# Patient Record
Sex: MALE | Race: BLACK OR AFRICAN AMERICAN
[De-identification: names, ages, dates, MRNs, and addresses within clinical notes are randomized per-mention and may not be internally consistent; named-entity substitution may affect disease eponyms.]

---

## 2018-01-02 ENCOUNTER — HOSPITAL ENCOUNTER (EMERGENCY)
Dept: HOSPITAL 62 - ER | Age: 38
Discharge: HOME | End: 2018-01-02
Payer: SELF-PAY

## 2018-01-02 VITALS — SYSTOLIC BLOOD PRESSURE: 139 MMHG | DIASTOLIC BLOOD PRESSURE: 92 MMHG

## 2018-01-02 DIAGNOSIS — R51: ICD-10-CM

## 2018-01-02 DIAGNOSIS — B34.9: ICD-10-CM

## 2018-01-02 DIAGNOSIS — M79.602: Primary | ICD-10-CM

## 2018-01-02 LAB
A TYPE INFLUENZA AG: NEGATIVE
ADD MANUAL DIFF: NO
ANION GAP SERPL CALC-SCNC: 8 MMOL/L (ref 5–19)
B INFLUENZA AG: NEGATIVE
BASOPHILS # BLD AUTO: 0 10^3/UL (ref 0–0.2)
BASOPHILS NFR BLD AUTO: 0.8 % (ref 0–2)
BUN SERPL-MCNC: 9 MG/DL (ref 7–20)
CALCIUM: 8.3 MG/DL (ref 8.4–10.2)
CHLORIDE SERPL-SCNC: 105 MMOL/L (ref 98–107)
CK SERPL-CCNC: 54 U/L (ref 55–170)
CO2 SERPL-SCNC: 23 MMOL/L (ref 22–30)
EOSINOPHIL # BLD AUTO: 0 10^3/UL (ref 0–0.6)
EOSINOPHIL NFR BLD AUTO: 0 % (ref 0–6)
ERYTHROCYTE [DISTWIDTH] IN BLOOD BY AUTOMATED COUNT: 15.8 % (ref 11.5–14)
GLUCOSE SERPL-MCNC: 130 MG/DL (ref 75–110)
HCT VFR BLD CALC: 46 % (ref 37.9–51)
HGB BLD-MCNC: 14.8 G/DL (ref 13.5–17)
LYMPHOCYTES # BLD AUTO: 1.3 10^3/UL (ref 0.5–4.7)
LYMPHOCYTES NFR BLD AUTO: 31.7 % (ref 13–45)
MAGNESIUM SERPL-MCNC: 1.8 MG/DL (ref 1.6–2.3)
MCH RBC QN AUTO: 22.8 PG (ref 27–33.4)
MCHC RBC AUTO-ENTMCNC: 32.2 G/DL (ref 32–36)
MCV RBC AUTO: 71 FL (ref 80–97)
MONOCYTES # BLD AUTO: 0.3 10^3/UL (ref 0.1–1.4)
MONOCYTES NFR BLD AUTO: 6.9 % (ref 3–13)
NEUTROPHILS # BLD AUTO: 2.5 10^3/UL (ref 1.7–8.2)
NEUTS SEG NFR BLD AUTO: 60.6 % (ref 42–78)
PLATELET # BLD: 206 10^3/UL (ref 150–450)
POTASSIUM SERPL-SCNC: 4 MMOL/L (ref 3.6–5)
RBC # BLD AUTO: 6.5 10^6/UL (ref 4.35–5.55)
SODIUM SERPL-SCNC: 135.8 MMOL/L (ref 137–145)
TOTAL CELLS COUNTED % (AUTO): 100 %
WBC # BLD AUTO: 4.2 10^3/UL (ref 4–10.5)

## 2018-01-02 PROCEDURE — 99284 EMERGENCY DEPT VISIT MOD MDM: CPT

## 2018-01-02 PROCEDURE — 80048 BASIC METABOLIC PNL TOTAL CA: CPT

## 2018-01-02 PROCEDURE — 93005 ELECTROCARDIOGRAM TRACING: CPT

## 2018-01-02 PROCEDURE — 85025 COMPLETE CBC W/AUTO DIFF WBC: CPT

## 2018-01-02 PROCEDURE — 96374 THER/PROPH/DIAG INJ IV PUSH: CPT

## 2018-01-02 PROCEDURE — 93010 ELECTROCARDIOGRAM REPORT: CPT

## 2018-01-02 PROCEDURE — 36415 COLL VENOUS BLD VENIPUNCTURE: CPT

## 2018-01-02 PROCEDURE — 82550 ASSAY OF CK (CPK): CPT

## 2018-01-02 PROCEDURE — 96361 HYDRATE IV INFUSION ADD-ON: CPT

## 2018-01-02 PROCEDURE — 96375 TX/PRO/DX INJ NEW DRUG ADDON: CPT

## 2018-01-02 PROCEDURE — 87804 INFLUENZA ASSAY W/OPTIC: CPT

## 2018-01-02 PROCEDURE — 83735 ASSAY OF MAGNESIUM: CPT

## 2018-01-02 RX ADMIN — SODIUM CHLORIDE PRN ML: 9 INJECTION, SOLUTION INTRAVENOUS at 04:46

## 2018-01-02 RX ADMIN — SODIUM CHLORIDE PRN ML: 9 INJECTION, SOLUTION INTRAVENOUS at 04:19

## 2018-01-02 NOTE — ER DOCUMENT REPORT
ED General





<TERRANCEFARZANA - Last Filed: 01/02/18 05:22>





- General


TRAVEL OUTSIDE OF THE [place] IN LAST 30 DAYS: No





<RAMYA BEYER - Last Filed: 01/02/18 06:07>





- General


Chief Complaint: Headache, L arm pain


Stated Complaint: HEADACHE,LEFT ARM PAIN


Time Seen by Provider: 01/02/18 03:17





- HPI


Notes: 





Patient is a 37-year-old male with no significant past medical history who 

presents to the ED complaining of a headache, decreased appetite, left shoulder 

and arm pain, bilateral knee pain, and bilateral feet pain.  Patient states 

that he feels that he has body aches and does feel subjectively warm.  Patient 

states that his symptoms have been ongoing for 2 days now which began with him 

feeling "lousy."  Patient states that one-point the headache was a 10 out of 10

, but is currently a 5 out of 10.  Patient states that his headache starts in 

the back of his head and works its way around and on top.  Patient states that 

he does have muscle soreness to the neck and left arm.  Pt has had issues with 

moving the left arm due to the pain.  He is otherwise eating and drinking 

without any difficulties.  He is urinating normally and having normal bowel 

movements.  He denies any IV drug use.  He denies any drug allergies.  Denies 

any fever, head injury, neck pain, changes in vision/speech/mentation/hearing, 

URI, sore throat, chest pain, palpitations, syncope, cough, shortness of breath

, wheeze, dyspnea, abdominal pain, nausea/vomiting/diarrhea, urinary retention, 

dysuria, hematuria, loss of control of bowel or bladder, numbness/tingling, 

saddle anesthesia, muscle paralysis/weakness, or rash. (RAMYA BEYER)





- Related Data


Allergies/Adverse Reactions: 


 





No Known Allergies Allergy (Verified 07/28/16 19:39)


 











Past Medical History





- Social History


Smoking Status: Current Every Day Smoker


Chew tobacco use (# tins/day): No


Frequency of alcohol use: None


Drug Abuse: None


Family History: Reviewed & Not Pertinent


Patient has suicidal ideation: No


Patient has homicidal ideation: No


Renal/ Medical History: Denies: Hx Peritoneal Dialysis





- Immunizations


Immunizations up to date: Yes


Hx Diphtheria, Pertussis, Tetanus Vaccination: Yes





<RAMYA BEYER - Last Filed: 01/02/18 06:07>





Review of Systems





<FARZANA CARLOS - Last Filed: 01/02/18 05:22>





<RAMYA BEYER - Last Filed: 01/02/18 06:07>





- Review of Systems


Notes: 





REVIEW OF SYSTEMS:


CONSTITUTIONAL :  see hpi. Denies fever,  chills, or sweats.  Denies recent 

illness.


EENT:   Denies eye, ear, throat, or mouth pain or symptoms.  Denies nasal or 

sinus congestion or discharge.  Denies throat, tongue, or mouth swelling or 

difficulty swallowing.


CARDIOVASCULAR:  Denies chest pain.  Denies palpitations or racing or irregular 

heart beat.  Denies ankle edema.


RESPIRATORY:  Denies cough, cold, or chest congestion.  Denies shortness of 

breath, difficulty breathing, or wheezing.


GASTROINTESTINAL:  Denies abdominal pain or distention.  Denies nausea, vomiting

, or diarrhea.  Denies blood in vomitus, stools, or per rectum.  Denies black, 

tarry stools.  Denies constipation.  


GENITOURINARY:  Denies difficulty urinating, painful urination, burning, 

frequency, blood in urine, or discharge.


MUSCULOSKELETAL:  see hpi


SKIN:   Denies rash, lesions or sores.


NEUROLOGICAL:  see hpi.  Denies confusion or altered mental status.  Denies 

passing out or loss of consciousness.  Denies dizziness or lightheadedness.  

Denies weakness or paralysis or loss of use of either side.  Denies problems 

with gait or speech.  Denies sensory loss, numbness, or tingling.  Denies 

seizures.


PSYCHIATRIC:  Denies anxiety or stress.  Denies depression, suicidal ideation, 

or homicidal ideation.





ALL OTHER SYSTEMS REVIEWED AND NEGATIVE.





Dictation was performed using Dragon voice recognition software (RAMYA BEYER)





Physical Exam





<FARZANA CARLOS - Last Filed: 01/02/18 05:22>





<RAMYA BEYER - Last Filed: 01/02/18 06:07>





- Vital signs


Vitals: 


 











Temp Pulse Resp BP Pulse Ox


 


 99.9 F   88   18   153/89 H  99 


 


 01/02/18 03:15  01/02/18 03:15  01/02/18 03:15  01/02/18 03:15  01/02/18 03:15











Notes: 





PHYSICAL EXAMINATION:





GENERAL: Well-appearing, well-nourished and in no acute distress.  A&Ox4, 

answers questions appropriately. Obese.





HEAD: Atraumatic, normocephalic.  Non-tender.  No crystal sign





EYES: Pupils equal round and reactive to light, extraocular movements intact, 

sclera anicteric, conjunctiva are normal.  Visual fields intact.





ENT: EAC clear b/l.  TM's intact b/l without erythema, fluid, or perforation.  

Nares patent and without discharge.  oropharynx clear without exudates.  No 

tonsilar hypertrophy or erythema.  Moist mucous membranes.  No sinus 

tenderness.  No hemotympanum/CSF discharge.





NECK: Normal range of motion, supple without lymphadenopathy.  No rigidity/

meningismus.  No midline tenderness. Spurling negative.  NEXUS negative.  + 

mild tenderness to the C-paraspinal mm and the trapezius mm b/l.  





Chest:  No flail chest.  equal rise/fall. Non-tender





LUNGS: Breath sounds clear to auscultation bilaterally and equal.  No wheezes 

rales or rhonchi.





HEART: Regular rate and rhythm without murmurs, rubs, gallops.





ABDOMEN: Soft, nontender, nondistended abdomen.  No guarding, no rebound.  No 

masses appreciated.  Normal bowel sounds present.  No CVA tenderness 

bilaterally.  





Musculoskeletal: Left shoulder:  LROM to active due to discomfort, FROM to 

passive.  Strength 5+/5. N/V intact distal. No warmth or swelling to the 

shoulder/elbow/wrist/hand.  No bony tenderness.  + tenderness elicited with 

palp of the soft tissues.  No compartment syndrome.


Ext otherwise b/l:  FROM to passive/active. Strength 5+/5.  No deficits noted.  

+ mild tenderness to the knees b/l and feet/toes.  N/v intact distal. No warmth

, erythema, or swelling noted.   Pt able to ambulate w/o difficulty.





Back:  FROM to passive/active.  Strength 5+/5.  No vertebral point tenderness, 

stepoffs, or deformities.  No other bony tenderness or ecchymosis.  SLR 

negative b/l.





Extremities:  No cyanosis, clubbing, or edema b/l.  Peripheral pulses 2+.  

Capillary refill less than 2 seconds.





NEUROLOGICAL: NIH 0, GCS 15, MMSE intact.  Cranial nerves grossly intact.  

Normal speech, normal gait.  Normal sensory, motor exams.  Reflexes 2+ b/l. TIM'

s negative.  Pronator drift negative. Heel/shin, finger/nose wnl. 





PSYCH: Normal mood, normal affect.





SKIN: Warm, Dry, normal turgor, no rashes or lesions noted. (RAMYA BEYER)





Course





- Laboratory


Result Diagrams: 


 01/02/18 04:17





 01/02/18 04:17





<FARZANA CARLOS - Last Filed: 01/02/18 05:22>





- Laboratory


Result Diagrams: 


 01/02/18 04:17





 01/02/18 04:55





<RAMYA BEYER - Last Filed: 01/02/18 06:07>





- Re-evaluation


Re-evalutation: 





01/02/18 05:22


Patient was initially seen by the PA.  I did also evaluate the patient on my 

own.  Patient is a pleasant 29-year-old male presents with complaint of neck 

pain with some headache as well as diffuse joint pains.  Patient says symptoms 

first started at least 2 days ago.  He said he first noticed some pain mainly 

into his left trapezius muscle and into his left shoulder that went up into his 

neck and start causing headache.  Said these pains were gradual onset not 

sudden in onset.  They are not maximal in onset.  Symptoms have continued and 

now he is also having pain into the right side of his back and then also into 

his joints such as elbows and knees.  He says he feels sore all over.  Patient 

denies any fevers.  No vomiting.  No diarrhea.  His temp in triage was 99.9.  

His vital signs are otherwise normal.  On exam he has pain to palpation over 

the left trapezius muscle and left shoulder.  He has also some pain to 

palpation over the right trapezius muscle.  He does have mild mild pain to 

palpation of his knees.  There is no swelling or redness or warmth to any of 

his joints.  He does work as a .  He denies any recent trauma or 

injuries that he is aware of during moving furniture.  We have ordered blood 

work to check electrolites and renal function status as well as to make sure he 

does not have a large leukocytosis.  Also ordered EKG being that he had does 

have some pain going to left arm although I suspect this is mostly 

musculoskeletal due to the easy reproduction with movement and palpation.  I 

suspect most likely is a viral illness causing the pain and body aches.  There 

is also possibility he could just have pain from working as a .  

I do not suspect bacterial meningitis and that his symptoms have been on going 

for 2 days and he has no fever and is awake alert and otherwise looks well.  We 

will wait for patient's workup before determining disposition.





Dictation of this chart was performed using voice recognition software; 

therefore, there may be some unintended grammatical errors. (FARZANA CARLOS)








01/02/18 06:04


Patient is an afebrile, well-hydrated, 37-year-old male who presents to the ED 

with musculoskeletal pain versus possible viral illness.  Vitals are stable.  

PE is otherwise unremarkable for any focal neurological deficits.  CBC, BMP, CPK

, EKG, influenza unremarkable for any acute pathology.  Low suspicion for any 

ACS, PE, pneumothorax, pericarditis, dissection, respiratory compromise, severe 

dehydration, sepsis, meningitis, or other systemic emergent condition at this 

time.  Patient is aware that her condition can change from initial presentation 

and she needs to monitor symptoms closely and seek medical attention for any 

acute changes. Toradol given IM along with tylenol, morphine, and fluids.  

Recommend conservative measures for symptoms.  Recheck with your PCM in 3-5 

days.  Consider consult with Ortho/P.T. Return to the ED with any worsening/

concerning symptoms otherwise as reviewed in discharge.  Patient is in 

agreement.





 (RAMYA BEYER)





- Vital Signs


Vital signs: 


 











Temp Pulse Resp BP Pulse Ox


 


 99.9 F   88   17   144/84 H  100 


 


 01/02/18 03:15  01/02/18 03:15  01/02/18 05:01  01/02/18 05:01  01/02/18 05:01














- Laboratory


Laboratory results interpreted by me: 


 











  01/02/18 01/02/18





  04:17 04:55


 


RBC  6.50 H 


 


MCV  71 L 


 


MCH  22.8 L 


 


RDW  15.8 H 


 


Sodium   135.8 L


 


Glucose   130 H


 


Calcium   8.3 L


 


Creatine Kinase   54 L














Discharge





<FARZANA CARLOS - Last Filed: 01/02/18 05:22>





<RAMYA BEYER - Last Filed: 01/02/18 06:07>





- Discharge


Clinical Impression: 


 Musculoskeletal pain of extremity, Viral illness





Condition: Stable


Disposition: HOME, SELF-CARE


Instructions:  Headache ([facility])


Additional Instructions: 


Rest, Ice, Compression, Elevation


Use crutches/splint/sling as directed


Tylenol/ibuprofen as needed


Light stretches daily


Strength exercises as able


Moist heat and massage may help


F/u with your PCP in 3-5 days for a recheck


Consider consult(s) with Orthopedics/physical therapy for ongoing/worsening 

symptoms





Return to the ED with any worsening symptoms and/or development of fever, 

headache, chest pain, palpitations, syncope, shortness of breath, trouble 

breathing, abdominal pain, n/v/d, muscle weakness/paralysis, numbness/tingling, 

swelling, redness, or other worsening symptoms that are concerning to you.


Prescriptions: 


Naproxen 500 mg PO BID PRN #30 tablet


 PRN Reason: 


Forms:  Elevated Blood Pressure, Smoking Cessation Education


Referrals: 


[facility] ([facility] [Provider Group] - Follow up as needed

## 2018-01-02 NOTE — EKG REPORT
SEVERITY:- BORDERLINE ECG -

SINUS RHYTHM

LVH BY VOLTAGE

BORDERLINE T ABNORMALITIES, INFERIOR LEADS

:

Confirmed by: Constantine Dohrety 02-Jan-2018 09:31:34

## 2020-08-11 ENCOUNTER — HOSPITAL ENCOUNTER (EMERGENCY)
Dept: HOSPITAL 62 - ER | Age: 40
Discharge: HOME | End: 2020-08-11
Payer: COMMERCIAL

## 2020-08-11 VITALS — DIASTOLIC BLOOD PRESSURE: 78 MMHG | SYSTOLIC BLOOD PRESSURE: 154 MMHG

## 2020-08-11 DIAGNOSIS — G89.29: ICD-10-CM

## 2020-08-11 DIAGNOSIS — M25.461: ICD-10-CM

## 2020-08-11 DIAGNOSIS — Z87.891: ICD-10-CM

## 2020-08-11 DIAGNOSIS — Z87.828: ICD-10-CM

## 2020-08-11 DIAGNOSIS — M25.561: Primary | ICD-10-CM

## 2020-08-11 DIAGNOSIS — F12.10: ICD-10-CM

## 2020-08-11 PROCEDURE — 99284 EMERGENCY DEPT VISIT MOD MDM: CPT

## 2020-08-11 PROCEDURE — 96372 THER/PROPH/DIAG INJ SC/IM: CPT

## 2020-08-11 PROCEDURE — 73564 X-RAY EXAM KNEE 4 OR MORE: CPT

## 2020-08-11 NOTE — ER DOCUMENT REPORT
ED Extremity Problem, Lower





- General


Chief Complaint: Knee Pain


Stated Complaint: KNEE PAIN


Time Seen by Provider: 08/11/20 12:02


Primary Care Provider: 


[facility] [Provider Group] - Follow up in 3-5 days


Mode of Arrival: Wheelchair


Information source: Patient


Notes: 





39-year-old male presented to ED for complaint of pain to his right knee.  He s

tates he injured it to 3 years ago and he thinks he had a x-ray at that time he 

is not sure.  He states intermittently it does get very painful and swollen.  He

does have to stand a lot at his job.  He states he went to work today and it was

just too painful to stand up.  He is alert oriented respirations regular 

nonlabored speaking in full sentences.  He is a former smoker drinks weekly and 

uses little marijuana.  He does work [employer] lives with his family.  He does have 

pain to both sides and the back of his knee.


TRAVEL OUTSIDE OF THE [place] IN LAST 30 DAYS: No





- HPI


Patient complains to provider of: Pain, Swelling


Location: Knee - Right


Occurred: Other - states he states he hurt it several years ago but it 

intermittently hurts


Onset/Duration: Intermittent


Quality of pain: Pressure, Sharp, Throbbing


Severity: Moderate


Pain Level: 4


Recent injury: No


Associated symptoms: Painful ambulation


Exacerbated by: Hanging down, Movement


Relieved by: Elevation, Ice, Rest





- Related Data


Allergies/Adverse Reactions: 


                                        





No Known Allergies Allergy (Verified 08/11/20 12:09)


   











Past Medical History





- General


Information source: Patient





- Social History


Smoking Status: Former Smoker


Frequency of alcohol use: Social


Drug Abuse: Marijuana


Lives with: Family - Call


Family History: Reviewed & Not Pertinent


Patient has suicidal ideation: No


Patient has homicidal ideation: No





- Past Medical History


Cardiac Medical History: Reports: None


Pulmonary Medical History: Reports: None


EENT Medical History: Reports: None


Neurological Medical History: Reports: None


Endocrine Medical History: Reports: None


Renal/ Medical History: Reports: None


Malignancy Medical History: Reports None


GI Medical History: Reports: None


Musculoskeletal Medical History: Reports Hx Arthritis, Reports Hx 

Musculoskeletal Deformity, Reports Hx Musculoskeletal Trauma


Skin Medical History: Reports None


Psychiatric Medical History: Reports: None


Traumatic Medical History: Reports: None


Infectious Medical History: Reports: None


Surgical Hx: Negative


Past Surgical History: Reports: None





- Immunizations


Immunizations up to date: Yes


Hx Diphtheria, Pertussis, Tetanus Vaccination: Yes





Review of Systems





- Review of Systems


Constitutional: No symptoms reported


EENT: No symptoms reported


Cardiovascular: No symptoms reported


Respiratory: No symptoms reported


Gastrointestinal: No symptoms reported


Genitourinary: No symptoms reported


Male Genitourinary: No symptoms reported


Musculoskeletal: Joint pain - Right knee, Joint swelling - Right knee


Skin: No symptoms reported


Hematologic/Lymphatic: No symptoms reported


Neurological/Psychological: No symptoms reported


-: Yes All other systems reviewed and negative





Physical Exam





- Vital signs


Vitals: 


                                        











Temp Pulse Resp BP Pulse Ox


 


 98.4 F   63   16   154/78 H  100 


 


 08/11/20 11:50  08/11/20 11:50  08/11/20 11:50  08/11/20 11:50  08/11/20 11:50











Interpretation: Normal





- General


General appearance: Appears well, Alert





- HEENT


Head: Normocephalic, Atraumatic


Eyes: Normal


Pupils: PERRL





- Respiratory


Respiratory status: No respiratory distress


Chest status: Nontender


Breath sounds: Normal


Chest palpation: Normal





- Cardiovascular


Rhythm: Regular


Heart sounds: Normal auscultation


Murmur: No





- Abdominal


Inspection: Normal


Distension: No distension


Bowel sounds: Normal


Tenderness: Nontender


Organomegaly: No organomegaly





- Back


Back: Normal, Nontender





- Extremities


General upper extremity: Normal inspection, Nontender, Normal color, Normal ROM,

Normal temperature


General lower extremity: Normal color, Normal temperature, Normal weight 

bearing.  No: Jermaine's sign


Knee: Tender, Joint effusion, Pain with ROM, Patellar tendon intact, Tender 

joint line.  No: Abrasion, Deformity, Dislocation, Drawer's test instability, 

Ecchymosis, Instability, Laceration, Laxity with valgus stress, Laxity with 

varus stress





- Neurological


Neuro grossly intact: Yes


Cognition: Normal


Orientation: AAOx4


Rory Coma Scale Eye Opening: Spontaneous


Rory Coma Scale Verbal: Oriented


Rory Coma Scale Motor: Obeys Commands


Maupin Coma Scale Total: 15


Speech: Normal


Motor strength normal: LUE, RUE, LLE, RLE


Sensory: Normal





- Psychological


Associated symptoms: Normal affect, Normal mood





- Skin


Skin Temperature: Warm


Skin Moisture: Dry


Skin Color: Normal





Course





- Re-evaluation


Re-evalutation: 





08/11/20 22:08


X-ray was negative for any acute injuries to the right knee.  He was treated 

with a Ace wrap in the emergency room discharged home with instructions to 

follow-up with his primary care and orthopedics.  Patient verbalized 

understanding and agreement treatment plan and patient was discharged home.  

Patient was able to walk out of the emergency room.





- Vital Signs


Vital signs: 


                                        











Temp Pulse Resp BP Pulse Ox


 


 98.4 F   63   16   154/78 H  100 


 


 08/11/20 11:50  08/11/20 11:50  08/11/20 11:50  08/11/20 11:50  08/11/20 11:50














- Diagnostic Test


Radiology reviewed: Image reviewed, Reports reviewed





Procedures





- Immobilization


  ** Right Knee


Time completed: 13:28


Immobilizer type: Ace wrap


Performed by: RN


Post-Proc Neuro Vasc Exam: Normal


Alignment checked and good: Yes





Discharge





- Discharge


Clinical Impression: 


 Chronic pain of right knee





Condition: Stable


Disposition: HOME, SELF-CARE


Additional Instructions: 


Have chronic pain to your right knee.  You states it hurts at times and other 

times it does not.  At this time you came in for the pain in the knee and 

weakness to the knee.  We have done an x-ray which did not show any radiological

injuries.  You will need to follow-up with orthopedics for them to do further 

studies to find out why you knee is hurting you at times.  We have applied an 

Ace wrap at this time.  This is to help you with your discomfort.  You can take 

it off when you are not having any pain.





Knee Exercise Program





     It's important to strengthen the muscles around the knee.  This protects 

the injured area and stabilizes a knee that's been loosened by ligament injury.


     EARLY - Even when motion of the knee is painful (even when wearing a 

splint), you can begin isometric "quads" exercises. While sitting, hold the knee

out, and contract the muscles to stiffen it.  It shouldn't be straightened all 

the way -- stiffen it in a slightly-bent position.  Lift the leg and draw a "T" 

with your foot, up to 100 times.  When it becomes easy, add a weight on your 

foot.


     LATE - When the doctor advises you, you can begin moving the knee against 

resistance.  The front muscles (quadriceps) are most important. While sitting at

a [employer], straighten the knee forcefully while pushing a weight up with 

your ankle.  Start with five to 10 pounds.  Do 10 to 20 repetitions, increasing 

the weight as tolerated.  Don't use more weight than is comfortable!  Over a few

weeks, work up to 35 to 50 pounds.  Athletes should try to reach 70 to 90 

pounds.





ACE WRAP:


     A compression dressing (ace wrap) has been placed.  This helps hold the 

area still. It limits swelling and internal bleeding.


     The wrap should be comfortably snug -- not tight.  You should feel a sense 

of pressure, but not severe pain under the wrap.  Unless the physician tells you

otherwise, you can adjust the wrap for comfort.


     If the wrap causes symptoms suggesting it's too tight -- uncomfortable 

pressure, swelling or discoloration beyond the wrap, numbness, or severe pain --

you must loosen the wrap.  If these symptoms don't resolve promptly, return for 

re-evaluation.





ICE & ELEVATION:


     Apply ice packs frequently against the painful area.  Many different 

schedules are recommended, such as "20 minutes on, 20 minutes off" or "one hour 

ice, two hours rest."  If you need to work, you may need to go longer between 

ice treatments.  You should plan to have the area ice packed AT LEAST one-fourth

of the time.


     The ice should be applied over the wrap, tape, or splint, or over a layer 

of cloth -- not directly against the skin.  Some ice bags have a built-in cloth 

and can be put directly on the skin.


     Your injured part should be elevated as much as possible over the next 48 

hours.  Try to keep the injury above the level of the heart. Avoid use of the 

injured area.  Elevation and rest will decrease the swelling.








USE OF OVER-THE-COUNTER IBUPROFEN:


     Ibuprofen (Advil, Nuprin, Medipren, Motrin IB) is a medication for fever 

and pain control.  In addition, it has anti- inflammatory effects which may be 

beneficial, especially in the treatment of injuries.


     It's best to take ibuprofen with food.  Persons with ulcer disease or 

allergy to aspirin should notify their physician of this before taking 

ibuprofen.


     Ibuprofen can be given every four to six hours, for a total of four doses 

daily.


     Age              Pain or fever dose          Antiinflammatory dose


     6-8 yr              200 mg (1 tab)                200 mg (1 tab)


     9-11 yr             200 mg (1 tab)                200-400 mg (1-2 tab)


     11-14 yr            200-400 mg (1-2 tab)         400 mg (2 tab)


     15-adult            400 mg (2 tab)                600 mg (3 tab)





Toradol Injection





     You have been given an injection of ketorolac tromethamine (Toradol).  This

is an excellent, safe drug for pain control.  It also has potent 

antiinflammatory action.  You should have significant pain relief within about 

one hour.


     Toradol is not addicting and is non-sedating.  It does not interfere with 

driving or work.


     Call or return if you develop itching, hives, shortness of breath, or rash.








FOLLOW-UP CARE:


If you have been referred to a physician for follow-up care, call the 

physicians office for an appointment as you were instructed or within the next 

two days.  If you experience worsening or a significant change in your symptoms,

notify the physician immediately or return to the Emergency Department at any 

time for re-evaluation.


Forms:  Elevated Blood Pressure, Return to Work


Referrals: 


[facility] ([facility] [Provider Group] - Follow up in 3-5 days

## 2020-08-11 NOTE — RADIOLOGY REPORT ([facility])
EXAM DESCRIPTION:  KNEE RIGHT 4 VIEWS



IMAGES COMPLETED DATE/TIME:  8/11/2020 12:58 pm



REASON FOR STUDY:  Pain swelling right knee old injury



COMPARISON:  7/28/2016.



NUMBER OF VIEWS:  Four views.



TECHNIQUE:  AP, lateral, and both oblique radiographic images acquired of the right knee.



LIMITATIONS:  None.



FINDINGS:  MINERALIZATION: Normal.

BONES: No acute fracture or dislocation. No worrisome bone lesions. No significant osteophytes.

JOINT: No effusion.  No chondrocalcinosis.

OTHER: No other significant finding.



IMPRESSION:  NEGATIVE STUDY OF THE RIGHT KNEE. NO EXPLANATION FOR PAIN.



TECHNICAL DOCUMENTATION:  JOB ID:  [id number]

 2011 [employer]- All Rights Reserved



Reading location - IP/workstation name: [email or web address]